# Patient Record
Sex: FEMALE | Race: BLACK OR AFRICAN AMERICAN | NOT HISPANIC OR LATINO | URBAN - METROPOLITAN AREA
[De-identification: names, ages, dates, MRNs, and addresses within clinical notes are randomized per-mention and may not be internally consistent; named-entity substitution may affect disease eponyms.]

---

## 2019-07-27 ENCOUNTER — INPATIENT (INPATIENT)
Facility: HOSPITAL | Age: 34
LOS: 2 days | Discharge: HOME | End: 2019-07-30
Attending: SPECIALIST | Admitting: SPECIALIST
Payer: MEDICAID

## 2019-07-27 ENCOUNTER — EMERGENCY (EMERGENCY)
Facility: HOSPITAL | Age: 34
LOS: 1 days | Discharge: STILL ADMITTED OR ADMINISTRATIVE | End: 2019-07-27
Admitting: EMERGENCY MEDICINE

## 2019-07-27 ENCOUNTER — RESULT REVIEW (OUTPATIENT)
Age: 34
End: 2019-07-27

## 2019-07-27 VITALS
TEMPERATURE: 98 F | DIASTOLIC BLOOD PRESSURE: 83 MMHG | SYSTOLIC BLOOD PRESSURE: 122 MMHG | HEART RATE: 77 BPM | RESPIRATION RATE: 16 BRPM | OXYGEN SATURATION: 100 %

## 2019-07-27 VITALS
TEMPERATURE: 98 F | HEART RATE: 74 BPM | RESPIRATION RATE: 16 BRPM | SYSTOLIC BLOOD PRESSURE: 118 MMHG | DIASTOLIC BLOOD PRESSURE: 74 MMHG

## 2019-07-27 DIAGNOSIS — Z3A.00 WEEKS OF GESTATION OF PREGNANCY NOT SPECIFIED: ICD-10-CM

## 2019-07-27 DIAGNOSIS — O26.899 OTHER SPECIFIED PREGNANCY RELATED CONDITIONS, UNSPECIFIED TRIMESTER: ICD-10-CM

## 2019-07-27 RX ORDER — MORPHINE SULFATE 50 MG/1
4 CAPSULE, EXTENDED RELEASE ORAL ONCE
Refills: 0 | Status: DISCONTINUED | OUTPATIENT
Start: 2019-07-27 | End: 2019-07-28

## 2019-07-27 NOTE — OB PROVIDER TRIAGE NOTE - NSHPPHYSICALEXAM_GEN_ALL_CORE
VS: 118/77  TOCO: ctx irregular  NST: 135, in progress  SSE: no pooling, nitrazie negative  VE: 1.5/50/-3, vertex VS: 118/77  TOCO: ctx irregular  NST: 135, in progress  SSE: no pooling, nitrazie negative  VE: 1.5/50/-3, vertex  Sono: vertex, Anterior placenta

## 2019-07-27 NOTE — OB PROVIDER TRIAGE NOTE - NSOBPROVIDERNOTE_OBGYN_ALL_OB_FT
33 yr old  @ 38.3 wks, r/o labor, r/o ROM  NO evidence of ROM  Latent labor  VE: 1.5/50/-3  SSE: no pooling, nitrazine negative 33 yr old  @ 38.3 wks, r/o labor, r/o ROM  NO evidence of ROM, nitrazine negative, ferning negative  Latent labor  VE: 1.5/50/-3  SSE: no pooling, Nitrazine negative    VS: 118/77  TOCO: ctx q 2 min  NST: 135, in progress  SSE: no pooling, Nitrazine negative  VE: 1.5/50/-3, vertex  Sono: vertex, Anterior placenta 33 yr old  @ 38.3 wks, r/o labor, r/o ROM  NO evidence of ROM, nitrazine negative, ferning negative  Latent labor  VE: 1.5/50/-3  SSE: no pooling, Nitrazine negative    VS: 118/77  TOCO: ctx q 2 min  NST: 135, in progress  SSE: no pooling, Nitrazine negative  VE: 1.5/50/-3, vertex  Sono: vertex, Anterior placenta    @ 11: 57 pm  Discussed with Dr. Najera-Royer  Morphine 4mg IM/ 4 mg SC  VE: x 2 hrs 33 yr old  @ 38.3 wks, r/o labor, r/o ROM  NO evidence of ROM, nitrazine negative, ferning negative  Latent labor  VE: 1.5/50/-3  SSE: no pooling, Nitrazine negative    VS: 118/77  TOCO: ctx q 2 min  NST: 135, in progress  SSE: no pooling, Nitrazine negative  VE: 1.5/50/-3, vertex  Sono: vertex, Anterior placenta    @ 11: 57 pm  Discussed with Dr. Najera-Royer  Morphine 4mg IM/ 4 mg SC  VE: x 2 hrs    @ 12:16 am  VE: 4/80/-3 BB  Admit  Epidural  Expectant management  GBS unknown 33 yr old  @ 38.3 wks, r/o labor, r/o ROM  NO evidence of ROM, nitrazine negative, ferning negative  Latent labor  VE: 1.5/50/-3  SSE: no pooling, Nitrazine negative    VS: 118/77  TOCO: ctx q 2 min  NST: 135, in progress  SSE: no pooling, Nitrazine negative  VE: 1.5/50/-3, vertex  Sono: vertex, Anterior placenta    @ 11: 57 pm  Discussed with Dr. Najera-Royer  Morphine 4mg IM/ 4 mg SC  VE: x 2 hrs    @ 12:16 am  VE: 4/80/-3 BB  Admit  Epidural  Expectant management  GBS negative as per patient

## 2019-07-27 NOTE — OB PROVIDER H&P - NSHPPHYSICALEXAM_GEN_ALL_CORE
VS: 118/77  TOCO: ctx irregular  NST: 135, in progress  SSE: no pooling, nitrazie negative  VE: 1.5/50/-3, vertex  Sono: vertex, Anterior placenta

## 2019-07-27 NOTE — ED ADULT TRIAGE NOTE - CHIEF COMPLAINT QUOTE
Pt arrives to ED as walk in reporting contractions with CONSTANTINE of 8/7/19.  Pt visiting from [place].  Contractions 3-4 minutes apart.  L&D called and accepted pt.  Dr. Moreno assessed pt in triage. Pt transported to L&D with family and ED Tech.

## 2019-07-27 NOTE — OB PROVIDER H&P - ASSESSMENT
33 yr old  @ 38.3 wks, r/o labor, r/o ROM  NO evidence of ROM, Nitrazine negative, ferning negative  Latent labor  VE: 1.5/50/-3  SSE: no pooling, Nitrazine negative    VS: 118/77  TOCO: ctx q 2 min  NST: 135, in progress  SSE: no pooling, Nitrazine negative  VE: 1.5/50/-3, vertex  Sono: vertex, Anterior placenta

## 2019-07-27 NOTE — OB PROVIDER TRIAGE NOTE - HISTORY OF PRESENT ILLNESS
33 yr old  @ 38.3 wks presents with LOF and ctx since 7pm. Denies VB. Reports positive fetal movement.   PNC in [place]  PNI: denies  Medhx: denies  Surghx: denies  GBS negative  EFW 3200 gm 33 yr old  @ 38.3 wks presents with LOF and ctx since 7pm. Denies VB. Reports positive fetal movement.   PNC in [place]  PNI: denies  Medhx: denies  Surghx: denies  GBS negative as per patient  EFW 3200 gm

## 2019-07-27 NOTE — OB PROVIDER H&P - HISTORY OF PRESENT ILLNESS
33 yr old  @ 38.3 wks presents with LOF and ctx since 7pm. Denies VB. Reports positive fetal movement.   PNC in [place]  PNI: denies  Medhx: denies  Surghx: denies  GBS negative  EFW 3200 gm

## 2019-07-28 LAB
AMPHET UR-MCNC: NEGATIVE — SIGNIFICANT CHANGE UP
BARBITURATES UR SCN-MCNC: NEGATIVE — SIGNIFICANT CHANGE UP
BASOPHILS # BLD AUTO: 0.03 K/UL — SIGNIFICANT CHANGE UP (ref 0–0.2)
BASOPHILS NFR BLD AUTO: 0.4 % — SIGNIFICANT CHANGE UP (ref 0–2)
BENZODIAZ UR-MCNC: NEGATIVE — SIGNIFICANT CHANGE UP
BLD GP AB SCN SERPL QL: NEGATIVE — SIGNIFICANT CHANGE UP
CANNABINOIDS UR-MCNC: NEGATIVE — SIGNIFICANT CHANGE UP
COCAINE METAB.OTHER UR-MCNC: NEGATIVE — SIGNIFICANT CHANGE UP
EOSINOPHIL # BLD AUTO: 0.2 K/UL — SIGNIFICANT CHANGE UP (ref 0–0.5)
EOSINOPHIL NFR BLD AUTO: 2.5 % — SIGNIFICANT CHANGE UP (ref 0–6)
HBV SURFACE AG SER-ACNC: NEGATIVE — SIGNIFICANT CHANGE UP
HCT VFR BLD CALC: 32.5 % — LOW (ref 34.5–45)
HGB BLD-MCNC: 10.1 G/DL — LOW (ref 11.5–15.5)
HIV COMBO RESULT: SIGNIFICANT CHANGE UP
HIV1+2 AB SPEC QL: SIGNIFICANT CHANGE UP
IMM GRANULOCYTES NFR BLD AUTO: 0.4 % — SIGNIFICANT CHANGE UP (ref 0–1.5)
LYMPHOCYTES # BLD AUTO: 2.78 K/UL — SIGNIFICANT CHANGE UP (ref 1–3.3)
LYMPHOCYTES # BLD AUTO: 34.3 % — SIGNIFICANT CHANGE UP (ref 13–44)
MCHC RBC-ENTMCNC: 26.4 PG — LOW (ref 27–34)
MCHC RBC-ENTMCNC: 31.1 % — LOW (ref 32–36)
MCV RBC AUTO: 85.1 FL — SIGNIFICANT CHANGE UP (ref 80–100)
METHADONE UR-MCNC: NEGATIVE — SIGNIFICANT CHANGE UP
MONOCYTES # BLD AUTO: 0.72 K/UL — SIGNIFICANT CHANGE UP (ref 0–0.9)
MONOCYTES NFR BLD AUTO: 8.9 % — SIGNIFICANT CHANGE UP (ref 2–14)
NEUTROPHILS # BLD AUTO: 4.35 K/UL — SIGNIFICANT CHANGE UP (ref 1.8–7.4)
NEUTROPHILS NFR BLD AUTO: 53.5 % — SIGNIFICANT CHANGE UP (ref 43–77)
NRBC # FLD: 0 K/UL — SIGNIFICANT CHANGE UP (ref 0–0)
OPIATES UR-MCNC: NEGATIVE — SIGNIFICANT CHANGE UP
OXYCODONE UR-MCNC: NEGATIVE — SIGNIFICANT CHANGE UP
PCP UR-MCNC: NEGATIVE — SIGNIFICANT CHANGE UP
PLATELET # BLD AUTO: 273 K/UL — SIGNIFICANT CHANGE UP (ref 150–400)
PMV BLD: 10.5 FL — SIGNIFICANT CHANGE UP (ref 7–13)
RBC # BLD: 3.82 M/UL — SIGNIFICANT CHANGE UP (ref 3.8–5.2)
RBC # FLD: 14.9 % — HIGH (ref 10.3–14.5)
RH IG SCN BLD-IMP: POSITIVE — SIGNIFICANT CHANGE UP
RH IG SCN BLD-IMP: POSITIVE — SIGNIFICANT CHANGE UP
WBC # BLD: 8.11 K/UL — SIGNIFICANT CHANGE UP (ref 3.8–10.5)
WBC # FLD AUTO: 8.11 K/UL — SIGNIFICANT CHANGE UP (ref 3.8–10.5)

## 2019-07-28 PROCEDURE — 59409 OBSTETRICAL CARE: CPT | Mod: U9,UB,GC

## 2019-07-28 PROCEDURE — 88307 TISSUE EXAM BY PATHOLOGIST: CPT | Mod: 26

## 2019-07-28 RX ORDER — OXYTOCIN 10 UNIT/ML
VIAL (ML) INJECTION
Qty: 20 | Refills: 0 | Status: DISCONTINUED | OUTPATIENT
Start: 2019-07-28 | End: 2019-07-28

## 2019-07-28 RX ORDER — DIBUCAINE 1 %
1 OINTMENT (GRAM) RECTAL EVERY 6 HOURS
Refills: 0 | Status: DISCONTINUED | OUTPATIENT
Start: 2019-07-28 | End: 2019-07-30

## 2019-07-28 RX ORDER — MAGNESIUM HYDROXIDE 400 MG/1
30 TABLET, CHEWABLE ORAL
Refills: 0 | Status: DISCONTINUED | OUTPATIENT
Start: 2019-07-28 | End: 2019-07-30

## 2019-07-28 RX ORDER — SODIUM CHLORIDE 9 MG/ML
3 INJECTION INTRAMUSCULAR; INTRAVENOUS; SUBCUTANEOUS EVERY 8 HOURS
Refills: 0 | Status: DISCONTINUED | OUTPATIENT
Start: 2019-07-28 | End: 2019-07-30

## 2019-07-28 RX ORDER — LANOLIN
1 OINTMENT (GRAM) TOPICAL EVERY 6 HOURS
Refills: 0 | Status: DISCONTINUED | OUTPATIENT
Start: 2019-07-28 | End: 2019-07-30

## 2019-07-28 RX ORDER — PRAMOXINE HYDROCHLORIDE 150 MG/15G
1 AEROSOL, FOAM RECTAL EVERY 4 HOURS
Refills: 0 | Status: DISCONTINUED | OUTPATIENT
Start: 2019-07-28 | End: 2019-07-30

## 2019-07-28 RX ORDER — KETOROLAC TROMETHAMINE 30 MG/ML
30 SYRINGE (ML) INJECTION ONCE
Refills: 0 | Status: DISCONTINUED | OUTPATIENT
Start: 2019-07-28 | End: 2019-07-28

## 2019-07-28 RX ORDER — OXYCODONE HYDROCHLORIDE 5 MG/1
5 TABLET ORAL ONCE
Refills: 0 | Status: DISCONTINUED | OUTPATIENT
Start: 2019-07-28 | End: 2019-07-30

## 2019-07-28 RX ORDER — OXYCODONE HYDROCHLORIDE 5 MG/1
5 TABLET ORAL
Refills: 0 | Status: DISCONTINUED | OUTPATIENT
Start: 2019-07-28 | End: 2019-07-30

## 2019-07-28 RX ORDER — HYDROCORTISONE 1 %
1 OINTMENT (GRAM) TOPICAL EVERY 6 HOURS
Refills: 0 | Status: DISCONTINUED | OUTPATIENT
Start: 2019-07-28 | End: 2019-07-30

## 2019-07-28 RX ORDER — BENZOCAINE 10 %
1 GEL (GRAM) MUCOUS MEMBRANE EVERY 6 HOURS
Refills: 0 | Status: DISCONTINUED | OUTPATIENT
Start: 2019-07-28 | End: 2019-07-30

## 2019-07-28 RX ORDER — GLYCERIN ADULT
1 SUPPOSITORY, RECTAL RECTAL AT BEDTIME
Refills: 0 | Status: DISCONTINUED | OUTPATIENT
Start: 2019-07-28 | End: 2019-07-30

## 2019-07-28 RX ORDER — DIPHENHYDRAMINE HCL 50 MG
25 CAPSULE ORAL EVERY 6 HOURS
Refills: 0 | Status: DISCONTINUED | OUTPATIENT
Start: 2019-07-28 | End: 2019-07-30

## 2019-07-28 RX ORDER — IBUPROFEN 200 MG
600 TABLET ORAL EVERY 6 HOURS
Refills: 0 | Status: DISCONTINUED | OUTPATIENT
Start: 2019-07-28 | End: 2019-07-30

## 2019-07-28 RX ORDER — IBUPROFEN 200 MG
600 TABLET ORAL EVERY 6 HOURS
Refills: 0 | Status: COMPLETED | OUTPATIENT
Start: 2019-07-28 | End: 2020-06-25

## 2019-07-28 RX ORDER — AER TRAVELER 0.5 G/1
1 SOLUTION RECTAL; TOPICAL EVERY 4 HOURS
Refills: 0 | Status: DISCONTINUED | OUTPATIENT
Start: 2019-07-28 | End: 2019-07-30

## 2019-07-28 RX ORDER — ACETAMINOPHEN 500 MG
975 TABLET ORAL
Refills: 0 | Status: DISCONTINUED | OUTPATIENT
Start: 2019-07-28 | End: 2019-07-30

## 2019-07-28 RX ORDER — SODIUM CHLORIDE 9 MG/ML
1000 INJECTION, SOLUTION INTRAVENOUS ONCE
Refills: 0 | Status: DISCONTINUED | OUTPATIENT
Start: 2019-07-28 | End: 2019-07-30

## 2019-07-28 RX ORDER — DOCUSATE SODIUM 100 MG
100 CAPSULE ORAL
Refills: 0 | Status: DISCONTINUED | OUTPATIENT
Start: 2019-07-28 | End: 2019-07-30

## 2019-07-28 RX ORDER — TETANUS TOXOID, REDUCED DIPHTHERIA TOXOID AND ACELLULAR PERTUSSIS VACCINE, ADSORBED 5; 2.5; 8; 8; 2.5 [IU]/.5ML; [IU]/.5ML; UG/.5ML; UG/.5ML; UG/.5ML
0.5 SUSPENSION INTRAMUSCULAR ONCE
Refills: 0 | Status: DISCONTINUED | OUTPATIENT
Start: 2019-07-28 | End: 2019-07-30

## 2019-07-28 RX ORDER — SIMETHICONE 80 MG/1
80 TABLET, CHEWABLE ORAL EVERY 4 HOURS
Refills: 0 | Status: DISCONTINUED | OUTPATIENT
Start: 2019-07-28 | End: 2019-07-30

## 2019-07-28 RX ADMIN — Medication 30 MILLIGRAM(S): at 02:35

## 2019-07-28 RX ADMIN — Medication 600 MILLIGRAM(S): at 14:38

## 2019-07-28 RX ADMIN — SODIUM CHLORIDE 3 MILLILITER(S): 9 INJECTION INTRAMUSCULAR; INTRAVENOUS; SUBCUTANEOUS at 14:40

## 2019-07-28 RX ADMIN — Medication 1000 MILLIUNIT(S)/MIN: at 01:00

## 2019-07-28 RX ADMIN — Medication 600 MILLIGRAM(S): at 15:30

## 2019-07-28 RX ADMIN — Medication 30 MILLIGRAM(S): at 02:50

## 2019-07-28 NOTE — OB PROVIDER DELIVERY SUMMARY - NSPROVIDERDELIVERYNOTE_OBGYN_ALL_OB_FT
Precipitious delivery with concern for placental abruption. Spontaneous vaginal delivery of liveborn infant from OA position. Head, shoulders, and body delivered easily. Infant was suctioned. No mec. Delayed cord clamping and infant was passed to mother. Cord clamped and cut. Placenta delivered intact with a 3 vessel cord. Fundal massage was given and uterine fundus was found to be firm. Vaginal exam revealed an intact cervix, vaginal walls and sulci. Perineum intact. Excellent hemostasis was noted. Patient was stable and went to recovery. Count was correct x 2. Precipitious delivery with concern for placental abruption. Spontaneous vaginal delivery of liveborn infant from OA position. Head, shoulders, and body delivered easily. Infant was suctioned. No mec. Delayed cord clamping and infant was passed to mother. Cord clamped and cut. Placenta delivered intact with a 3 vessel cord. Fundal massage was given and uterine fundus was found to be firm. Vaginal exam revealed an intact cervix, vaginal walls and sulci. Perineum intact. Excellent hemostasis was noted. Patient was stable and went to recovery. Count was correct x 2.    Attending Note   I was present for delivery.

## 2019-07-29 ENCOUNTER — TRANSCRIPTION ENCOUNTER (OUTPATIENT)
Age: 34
End: 2019-07-29

## 2019-07-29 LAB
RUBV IGG SER-ACNC: 3.8 INDEX — SIGNIFICANT CHANGE UP
RUBV IGG SER-IMP: POSITIVE — SIGNIFICANT CHANGE UP

## 2019-07-29 RX ORDER — IBUPROFEN 200 MG
1 TABLET ORAL
Qty: 0 | Refills: 0 | DISCHARGE
Start: 2019-07-29

## 2019-07-29 RX ORDER — ACETAMINOPHEN 500 MG
3 TABLET ORAL
Qty: 0 | Refills: 0 | DISCHARGE
Start: 2019-07-29

## 2019-07-29 RX ADMIN — Medication 600 MILLIGRAM(S): at 00:53

## 2019-07-29 RX ADMIN — Medication 600 MILLIGRAM(S): at 17:44

## 2019-07-29 RX ADMIN — Medication 600 MILLIGRAM(S): at 18:17

## 2019-07-29 RX ADMIN — Medication 600 MILLIGRAM(S): at 06:24

## 2019-07-29 RX ADMIN — Medication 975 MILLIGRAM(S): at 21:00

## 2019-07-29 RX ADMIN — Medication 1 TABLET(S): at 12:47

## 2019-07-29 RX ADMIN — Medication 600 MILLIGRAM(S): at 00:23

## 2019-07-29 RX ADMIN — Medication 600 MILLIGRAM(S): at 23:35

## 2019-07-29 RX ADMIN — Medication 975 MILLIGRAM(S): at 21:30

## 2019-07-29 RX ADMIN — Medication 600 MILLIGRAM(S): at 05:54

## 2019-07-29 NOTE — DISCHARGE NOTE OB - MEDICATION SUMMARY - MEDICATIONS TO TAKE
I will START or STAY ON the medications listed below when I get home from the hospital:    acetaminophen 325 mg oral tablet  -- 3 tab(s) by mouth   -- Indication: For pain    ibuprofen 600 mg oral tablet  -- 1 tab(s) by mouth every 6 hours  -- Indication: For pain    Prenatal Multivitamins with Folic Acid 1 mg oral tablet  -- 1 tab(s) by mouth once a day  -- Indication: For breastfeeding

## 2019-07-29 NOTE — DISCHARGE NOTE OB - CARE PROVIDER_API CALL
[facility],   Phone: [phone]  Fax: (   )    -  Follow Up Time:     [facility],   Phone: (   )    -  Fax: (   )    -  Follow Up Time:

## 2019-07-29 NOTE — DISCHARGE NOTE OB - PLAN OF CARE
Recovery Make your follow-up appointment with your doctor at [facility] in 4-6 weeks for a post-partum check. No heavy lifting, driving, or strenuous activity for 6 weeks. Nothing per vagina such as tampons, intercourse, douches, or tub baths for 6 weeks or until you see your doctor. Call your doctor with any signs and symptoms of infection such as fever, chills, nausea, or vomiting. Call your doctor if you're unable to tolerate food, increase in vaginal bleeding, or have difficulty urinating. Call your doctor if you have pain that is not relieved by your prescribed medications. Notify your doctor with any other concerns.   Call [phone] if you have any of these concerns in the next 6 weeks.

## 2019-07-29 NOTE — DISCHARGE NOTE OB - CARE PLAN
Principal Discharge DX:	Vaginal delivery  Goal:	Recovery  Assessment and plan of treatment:	Make your follow-up appointment with your doctor at [facility] in 4-6 weeks for a post-partum check. No heavy lifting, driving, or strenuous activity for 6 weeks. Nothing per vagina such as tampons, intercourse, douches, or tub baths for 6 weeks or until you see your doctor. Call your doctor with any signs and symptoms of infection such as fever, chills, nausea, or vomiting. Call your doctor if you're unable to tolerate food, increase in vaginal bleeding, or have difficulty urinating. Call your doctor if you have pain that is not relieved by your prescribed medications. Notify your doctor with any other concerns.   Call [phone] if you have any of these concerns in the next 6 weeks.

## 2019-07-29 NOTE — DISCHARGE NOTE OB - HOSPITAL COURSE
Patient had an uncomplicated  followed by an uncomplicated postpartum course. EBL: 200 . Hct: 32.5.  On postpartum day 2, patient was discharged home in stable condition, voiding spontaneously and with normal vital signs.

## 2019-07-29 NOTE — DISCHARGE NOTE OB - PROVIDER TOKENS
FREE:[LAST:[[facility]],PHONE:[phone]],FAX:[(   )    -]],FREE:[LAST:[[facility]],PHONE:[(   )    -],FAX:[(   )    -]]

## 2019-07-29 NOTE — DISCHARGE NOTE OB - PATIENT PORTAL LINK FT
You can access the [facility] Patient Portal, offered by [facility], by registering with the following website: [email or web address]

## 2019-07-30 VITALS
SYSTOLIC BLOOD PRESSURE: 111 MMHG | OXYGEN SATURATION: 100 % | TEMPERATURE: 98 F | RESPIRATION RATE: 18 BRPM | DIASTOLIC BLOOD PRESSURE: 66 MMHG | HEART RATE: 72 BPM

## 2019-07-30 LAB — T PALLIDUM AB TITR SER: NEGATIVE — SIGNIFICANT CHANGE UP

## 2019-07-30 RX ADMIN — Medication 975 MILLIGRAM(S): at 12:15

## 2019-07-30 RX ADMIN — Medication 600 MILLIGRAM(S): at 07:30

## 2019-07-30 RX ADMIN — Medication 1 TABLET(S): at 11:41

## 2019-07-30 RX ADMIN — Medication 600 MILLIGRAM(S): at 00:05

## 2019-07-30 RX ADMIN — Medication 600 MILLIGRAM(S): at 07:00

## 2019-07-30 RX ADMIN — Medication 975 MILLIGRAM(S): at 11:30

## 2019-07-30 NOTE — PROGRESS NOTE ADULT - PROBLEM SELECTOR PLAN 1
- Condoms for contraception  - Will f/u with her OBGYN in [place]  - Pain well controlled, continue current pain regimen  - Increase ambulation, SCDs when not ambulating  - Continue regular diet  - Discharge planning     Kami Fallon PGY1
-F/u PNL from [place]  - Pain well controlled, continue current pain regimen  - Increase ambulation, SCDs when not ambulating  - Continue regular diet    Robyn Frankel PGY-1

## 2019-07-30 NOTE — PROGRESS NOTE ADULT - ATTENDING COMMENTS
Associate Chief of L 7 D     I Have physically seen this patient and she was also discussed at the PP Multidisciplinary huddle.  She is cleared for discharge,  Her vitals are stable  Fundus firm'  Lochia wnl  Perineum normal decreased swelling    Patient will return to [place] within the next few days for her PP care.  Answered all questions

## 2019-07-30 NOTE — PROGRESS NOTE ADULT - SUBJECTIVE AND OBJECTIVE BOX
Associate Chief of Labor and Delivery    This patient is unfamiliar to me.  My first encounter with the patient was when I saw her today for PP care.  She receives her care in [place] and was visiting family when she went into labor.  Upon review of the chart and speaking with the patient she is PPD#1 s/p .    Vital are stable  Fundus firm  Labia mildly swollen  Lochia WNL    S/P    Continue routine PP care   Patient reported that she will get her PP care in [place]
OB Progress Note:  PPD#1    S: 34yo PPD#1 s/p . Patient feels well. Pain is well controlled. She is tolerating a regular diet and passing flatus. She is voiding spontaneously, and ambulating without difficulty. Denies CP/SOB. Denies lightheadedness/dizziness. Denies N/V. Denies heavy vaginal bleeding.    O:  Vitals:  Vital Signs Last 24 Hrs  T(C): 37 (2019 05:11), Max: 37.2 (2019 18:26)  T(F): 98.6 (2019 05:11), Max: 99 (2019 18:26)  HR: 81 (2019 05:11) (68 - 81)  BP: 102/57 (2019 05:11) (102/57 - 112/66)  BP(mean): --  RR: 18 (2019 05:11) (18 - 18)  SpO2: 100% (2019 05:11) (98% - 100%)    MEDICATIONS  (STANDING):  acetaminophen   Tablet .. 975 milliGRAM(s) Oral <User Schedule>  diphtheria/tetanus/pertussis (acellular) Vaccine (ADAcel) 0.5 milliLiter(s) IntraMuscular once  ibuprofen  Tablet. 600 milliGRAM(s) Oral every 6 hours  lactated ringers Bolus 1000 milliLiter(s) IV Bolus once  prenatal multivitamin 1 Tablet(s) Oral daily  sodium chloride 0.9% lock flush 3 milliLiter(s) IV Push every 8 hours      Labs:  Blood type: A Positive  Rubella IgG: Positive ( @ 00:30)  RPR:                           10.1<L>   8.11 >-----------< 273    (  @ 00:30 )             32.5<L>                  Physical Exam:  General: NAD  Abdomen: soft, non-tender, non-distended, fundus firm  Vaginal: No heavy vaginal bleeding  Extremities: No erythema/edema
OB Progress Note:  PPD#2    S: 34yo PPD#2 s/p . Patient feels well. Pain is well controlled. She is tolerating a regular diet and passing flatus. She is voiding spontaneously, and ambulating without difficulty. Denies CP/SOB. Denies lightheadedness/dizziness. Denies N/V. Denies heavy vaginal bleeding.    O:  Vitals:   Vital Signs Last 24 Hrs  T(C): 36.9 (2019 05:25), Max: 37.6 (2019 18:10)  T(F): 98.4 (2019 05:25), Max: 99.7 (2019 18:10)  HR: 72 (2019 05:25) (72 - 76)  BP: 111/66 (2019 05:25) (111/66 - 115/66)  BP(mean): --  RR: 18 (2019 05:25) (17 - 18)  SpO2: 100% (2019 05:25) (100% - 100%)    MEDICATIONS  (STANDING):  acetaminophen   Tablet .. 975 milliGRAM(s) Oral <User Schedule>  diphtheria/tetanus/pertussis (acellular) Vaccine (ADAcel) 0.5 milliLiter(s) IntraMuscular once  ibuprofen  Tablet. 600 milliGRAM(s) Oral every 6 hours  lactated ringers Bolus 1000 milliLiter(s) IV Bolus once  prenatal multivitamin 1 Tablet(s) Oral daily  sodium chloride 0.9% lock flush 3 milliLiter(s) IV Push every 8 hours    MEDICATIONS  (PRN):  benzocaine 20%/menthol 0.5% Spray 1 Spray(s) Topical every 6 hours PRN for Perineal discomfort  dibucaine 1% Ointment 1 Application(s) Topical every 6 hours PRN Perineal discomfort  diphenhydrAMINE 25 milliGRAM(s) Oral every 6 hours PRN Pruritus  docusate sodium 100 milliGRAM(s) Oral two times a day PRN For stool softening  glycerin Suppository - Adult 1 Suppository(s) Rectal at bedtime PRN Constipation  hydrocortisone 1% Cream 1 Application(s) Topical every 6 hours PRN Moderate Pain (4-6)  lanolin Ointment 1 Application(s) Topical every 6 hours PRN nipple soreness  magnesium hydroxide Suspension 30 milliLiter(s) Oral two times a day PRN Constipation  oxyCODONE    IR 5 milliGRAM(s) Oral every 3 hours PRN Moderate to Severe Pain (4-10)  oxyCODONE    IR 5 milliGRAM(s) Oral once PRN Moderate to Severe Pain (4-10)  pramoxine 1%/zinc 5% Cream 1 Application(s) Topical every 4 hours PRN Moderate Pain (4-6)  simethicone 80 milliGRAM(s) Chew every 4 hours PRN Gas  witch hazel Pads 1 Application(s) Topical every 4 hours PRN Perineal discomfort      Labs:  Blood type: A Positive  Rubella IgG: Positive ( @ 00:30)  RPR: Negative                          10.1<L>   8.11 >-----------< 273    (  @ 00:30 )             32.5<L>                  Physical Exam:  General: NAD  Abdomen: soft, non-tender, non-distended, fundus firm  Vaginal: No heavy vaginal bleeding  Extremities: No erythema/edema

## 2019-07-30 NOTE — PROGRESS NOTE ADULT - ASSESSMENT
A/P: 32yo PPD#1 s/p .  Patient is stable and doing well post-partum.
A/P: 34yo PPD#2 s/p .  Patient is stable and doing well post-partum.

## 2019-08-19 LAB — SURGICAL PATHOLOGY STUDY: SIGNIFICANT CHANGE UP

## 2024-01-23 NOTE — OB RN DELIVERY SUMMARY - NS_LABORINFO_OBGYN_ALL_OB
[Not Examined] : not examined [Normal] : The patient is moving all extremities spontaneously without any gross neurologic deficits. They walk with a fluid nonantalgic gait. There are equal and symmetric deep tendon reflexes in the upper and lower extremities bilaterally. There is gross intact sensation to soft and light touch in the bilateral upper and lower extremities [de-identified] : intact motor islt  Precipitous delivery (<3hrs)

## 2024-04-11 NOTE — OB RN PATIENT PROFILE - NS_FINALEDD_OBGYN_ALL_OB_DT
Learning About Vision Tests  What are vision tests?     The four most common vision tests are visual acuity tests, refraction, visual field tests, and color vision tests.  Visual acuity (sharpness) tests  These tests are used:  To see if you need glasses or contact lenses.  To monitor an eye problem.  To check an eye injury.  Visual acuity tests are done as part of routine exams. You may also have this test when you get your 's license or apply for some types of jobs.  Visual field tests  These tests are used:  To check for vision loss in any area of your range of vision.  To screen for certain eye diseases.  To look for nerve damage after a stroke, head injury, or other problem that could reduce blood flow to the brain.  Refraction and color tests  A refraction test is done to find the right prescription for glasses and contact lenses.  A color vision test is done to check for color blindness.  Color vision is often tested as part of a routine exam. You may also have this test when you apply for a job where recognizing different colors is important, such as , electronics, or the .  How are vision tests done?  Visual acuity test   You cover one eye at a time.  You read aloud from a wall chart across the room.  You read aloud from a small card that you hold in your hand.  Refraction   You look into a special device.  The device puts lenses of different strengths in front of each eye to see how strong your glasses or contact lenses need to be.  Visual field tests   Your doctor may have you look through special machines.  Or your doctor may simply have you stare straight ahead while they move a finger into and out of your field of vision.  Color vision test   You look at pieces of printed test patterns in various colors. You say what number or symbol you see.  Your doctor may have you trace the number or symbol using a pointer.  How do these tests feel?  There is very little chance of  07-Aug-2019